# Patient Record
Sex: MALE | Race: WHITE | URBAN - METROPOLITAN AREA
[De-identification: names, ages, dates, MRNs, and addresses within clinical notes are randomized per-mention and may not be internally consistent; named-entity substitution may affect disease eponyms.]

---

## 2022-05-24 ENCOUNTER — OUT OF OFFICE VISIT (OUTPATIENT)
Dept: URBAN - METROPOLITAN AREA MEDICAL CENTER 1 | Facility: MEDICAL CENTER | Age: 47
End: 2022-05-24
Payer: SELF-PAY

## 2022-05-24 DIAGNOSIS — R93.3 ABN FINDINGS-GI TRACT: ICD-10-CM

## 2022-05-24 DIAGNOSIS — E80.6 ABNORMAL BILIRUBIN TEST: ICD-10-CM

## 2022-05-24 DIAGNOSIS — R11.2 ACUTE NAUSEA WITH NONBILIOUS VOMITING: ICD-10-CM

## 2022-05-24 DIAGNOSIS — R10.13 ABDOMINAL DISCOMFORT, EPIGASTRIC: ICD-10-CM

## 2022-05-24 DIAGNOSIS — K85.10 ACUTE BILIARY PANCREATITIS: ICD-10-CM

## 2022-05-24 DIAGNOSIS — R10.84 ABDOMINAL CRAMPING, GENERALIZED: ICD-10-CM

## 2022-05-24 PROCEDURE — 99232 SBSQ HOSP IP/OBS MODERATE 35: CPT | Performed by: INTERNAL MEDICINE

## 2022-05-24 PROCEDURE — 99254 IP/OBS CNSLTJ NEW/EST MOD 60: CPT | Performed by: INTERNAL MEDICINE

## 2023-01-11 ENCOUNTER — TELEPHONE ENCOUNTER (OUTPATIENT)
Dept: URBAN - NONMETROPOLITAN AREA CLINIC 2 | Facility: CLINIC | Age: 48
End: 2023-01-11

## 2023-02-16 ENCOUNTER — WEB ENCOUNTER (OUTPATIENT)
Dept: URBAN - NONMETROPOLITAN AREA CLINIC 13 | Facility: CLINIC | Age: 48
End: 2023-02-16

## 2023-02-16 ENCOUNTER — OFFICE VISIT (OUTPATIENT)
Dept: URBAN - NONMETROPOLITAN AREA CLINIC 13 | Facility: CLINIC | Age: 48
End: 2023-02-16
Payer: SELF-PAY

## 2023-02-16 VITALS
SYSTOLIC BLOOD PRESSURE: 129 MMHG | HEART RATE: 68 BPM | BODY MASS INDEX: 39.6 KG/M2 | WEIGHT: 292.4 LBS | HEIGHT: 72 IN | DIASTOLIC BLOOD PRESSURE: 89 MMHG

## 2023-02-16 DIAGNOSIS — K85.90 ACUTE PANCREATITIS, UNSPECIFIED COMPLICATION STATUS, UNSPECIFIED PANCREATITIS TYPE: ICD-10-CM

## 2023-02-16 DIAGNOSIS — K80.20 CALCULUS OF GALLBLADDER WITHOUT CHOLECYSTITIS WITHOUT OBSTRUCTION: ICD-10-CM

## 2023-02-16 PROBLEM — 70342003: Status: ACTIVE | Noted: 2023-02-16

## 2023-02-16 PROCEDURE — 99214 OFFICE O/P EST MOD 30 MIN: CPT | Performed by: INTERNAL MEDICINE

## 2023-02-16 NOTE — HPI-TODAY'S VISIT:
Miguel is a very pleasant 48-year-old gentleman, referred by Duncan Echeverria for history of pancreatitis.  A copy of records will be sent to his office.  He states that early 2022 he developed abdominal pain that was severe in nature.  He was ultimately seen in the emergency room where he was admitted for acute pancreatitis.  Liver tests were essentially normal.  Imaging did suggest gallstones without biliary ductal dilation.  He did have further imaging that did not suggest biliary obstruction.  There is no need for ERCP.  Surgical evaluation was entertained but he ultimately did not undergo cholecystectomy.  He is not a heavy drinker and given lack of liver test elevations the etiology of his pancreatitis was unclear.  Since that time he has not had any further episodes of acute pancreatitis.  He does have occasional episodes of abdominal pain which he thinks is diet related.  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  He does note that when he eats more unhealthy food he has more GI issues.  He also saw Mr. Waldo Mikal who apparently ordered a FibroScan which was unrevealing.  Most recent labs by Dr. Echeverria were normal.  These did not suggest any obvious liver abnormalities.

## 2023-06-15 ENCOUNTER — OFFICE VISIT (OUTPATIENT)
Dept: URBAN - NONMETROPOLITAN AREA CLINIC 13 | Facility: CLINIC | Age: 48
End: 2023-06-15

## 2024-03-14 ENCOUNTER — LAB (OUTPATIENT)
Dept: URBAN - NONMETROPOLITAN AREA CLINIC 13 | Facility: CLINIC | Age: 49
End: 2024-03-14

## 2024-03-14 ENCOUNTER — OV EP (OUTPATIENT)
Dept: URBAN - NONMETROPOLITAN AREA CLINIC 13 | Facility: CLINIC | Age: 49
End: 2024-03-14
Payer: SELF-PAY

## 2024-03-14 VITALS
BODY MASS INDEX: 37.52 KG/M2 | HEART RATE: 69 BPM | SYSTOLIC BLOOD PRESSURE: 132 MMHG | WEIGHT: 277 LBS | HEIGHT: 72 IN | DIASTOLIC BLOOD PRESSURE: 89 MMHG

## 2024-03-14 DIAGNOSIS — K85.80 ACUTE TRAUMATIC PANCREATITIS: ICD-10-CM

## 2024-03-14 DIAGNOSIS — R10.13 EPIGASTRIC PAIN: ICD-10-CM

## 2024-03-14 DIAGNOSIS — K80.20 CALCULUS OF GALLBLADDER WITHOUT CHOLECYSTITIS WITHOUT OBSTRUCTION: ICD-10-CM

## 2024-03-14 PROCEDURE — 99214 OFFICE O/P EST MOD 30 MIN: CPT | Performed by: NURSE PRACTITIONER

## 2024-03-14 RX ORDER — LISINOPRIL 40 MG/1
TAKE 1 TABLET BY MOUTH EVERY DAY TABLET ORAL
Qty: 90 EACH | Refills: 0 | Status: ACTIVE | COMMUNITY

## 2024-03-14 NOTE — HPI-OTHER HISTORIES
2/16/2023 Miguel is a very pleasant 48-year-old gentleman, referred by Duncan Echeverria for history of pancreatitis. A copy of records will be sent to his office. He states that early 2022 he developed abdominal pain that was severe in nature. He was ultimately seen in the emergency room where he was admitted for acute pancreatitis. Liver tests were essentially normal. Imaging did suggest gallstones without biliary ductal dilation. He did have further imaging that did not suggest biliary obstruction. There is no need for ERCP. Surgical evaluation was entertained but he ultimately did not undergo cholecystectomy. He is not a heavy drinker and given lack of liver test elevations the etiology of his pancreatitis was unclear. Since that time he has not had any further episodes of acute pancreatitis. He does have occasional episodes of abdominal pain which he thinks is diet related. 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 He does note that when he eats more unhealthy food he has more GI issues. He also saw Mr. Waldo Ren who apparently ordered a FibroScan which was unrevealing. Most recent labs by Dr. Echeverria were normal. These did not suggest any obvious liver abnormalities.

## 2024-03-14 NOTE — HPI-TODAY'S VISIT:
3/14/2024 Mr. Mike Villalobos is a 49 year old male referred by Dr Echeverria for recurrent pancreatitis. He wa last seen a year ago after an episode of acute pancreatitis in 2022. The etiology of the pancreatitis was not clear. He did have gallstones but no ductal dilatation. He has had another episode of acute pancreatitis last week with a lipase of 600 and elevated amalyse. His liver enzymes and bilirubin are normal. He had a CT 2/29 with a normal pancreas and a MRI 3/12 with no signs of pancreatitis. Last year he had a positive LUIS. His IgG 4 was normal. His attacks always start with feeling tired, middle back pain, lower back pain, and abdominal bloating and gurgling. He will cut back to liquids and light diet and his symptoms will resolve most of the time. This last time they continued. He denies any nausea, vomiting or diarrhea with it. His stool do become lighter and float. He has seen sugary foods may contribute. CS

## 2024-03-29 ENCOUNTER — EUS (OUTPATIENT)
Dept: URBAN - METROPOLITAN AREA MEDICAL CENTER 1 | Facility: MEDICAL CENTER | Age: 49
End: 2024-03-29

## 2024-04-26 ENCOUNTER — OV EP (OUTPATIENT)
Dept: URBAN - NONMETROPOLITAN AREA CLINIC 13 | Facility: CLINIC | Age: 49
End: 2024-04-26

## 2024-04-26 RX ORDER — LISINOPRIL 40 MG/1
TAKE 1 TABLET BY MOUTH EVERY DAY TABLET ORAL
Qty: 90 EACH | Refills: 0 | Status: ACTIVE | COMMUNITY

## 2024-04-26 NOTE — HPI-TODAY'S VISIT:
4/26/2024 Mr. Mike Villalobos is here for f/u of recurrent pancreatitis. He wa last seen a year ago after an episode of acute pancreatitis in 2022. The etiology of the pancreatitis was not clear. He did have gallstones but no ductal dilatation. He has had another episode of acute pancreatitis last week with a lipase of 600 and elevated amalyse. His liver enzymes and bilirubin are normal. He had a CT 2/29 with a normal pancreas and a MRI 3/12 with no signs of pancreatitis. Last year he had a positive LUIS. His IgG 4 was normal. His attacks always start with feeling tired, middle back pain, lower back pain, and abdominal bloating and gurgling. He will cut back to liquids and light diet and his symptoms will resolve most of the time. This last time they continued. He denies any nausea, vomiting or diarrhea with it. His stool do become lighter and float. He has seen sugary foods may contribute. CS

## 2024-04-26 NOTE — HPI-OTHER HISTORIES
2/16/2023 Miguel is a very pleasant 48-year-old gentleman, referred by Duncan Echeverria for history of pancreatitis. A copy of records will be sent to his office. He states that early 2022 he developed abdominal pain that was severe in nature. He was ultimately seen in the emergency room where he was admitted for acute pancreatitis. Liver tests were essentially normal. Imaging did suggest gallstones without biliary ductal dilation. He did have further imaging that did not suggest biliary obstruction. There is no need for ERCP. Surgical evaluation was entertained but he ultimately did not undergo cholecystectomy. He is not a heavy drinker and given lack of liver test elevations the etiology of his pancreatitis was unclear. Since that time he has not had any further episodes of acute pancreatitis. He does have occasional episodes of abdominal pain which he thinks is diet related. 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 He does note that when he eats more unhealthy food he has more GI issues. He also saw Mr. Waldo Ren who apparently ordered a FibroScan which was unrevealing. Most recent labs by Dr. Echeverria were normal. These did not suggest any obvious liver abnormalities.  3/14/2024 Mr. Mike Villalobos is a 49 year old male referred by Dr Echeverria for recurrent pancreatitis. He wa last seen a year ago after an episode of acute pancreatitis in 2022. The etiology of the pancreatitis was not clear. He did have gallstones but no ductal dilatation. He has had another episode of acute pancreatitis last week with a lipase of 600 and elevated amalyse. His liver enzymes and bilirubin are normal. He had a CT 2/29 with a normal pancreas and a MRI 3/12 with no signs of pancreatitis. Last year he had a positive LUIS. His IgG 4 was normal. His attacks always start with feeling tired, middle back pain, lower back pain, and abdominal bloating and gurgling. He will cut back to liquids and light diet and his symptoms will resolve most of the time. This last time they continued. He denies any nausea, vomiting or diarrhea with it. His stool do become lighter and float. He has seen sugary foods may contribute. CS  3/29/2024 EUS: normal esophagus, stomach, and duodenum. Normal pancreas and pancreatic duct. Normal CBD. Gallstones were noted. Unclear cause for pancreatitis could have been gallstones related- referred to surgery

## 2024-06-06 ENCOUNTER — OFFICE VISIT (OUTPATIENT)
Dept: URBAN - NONMETROPOLITAN AREA CLINIC 13 | Facility: CLINIC | Age: 49
End: 2024-06-06

## 2024-06-06 RX ORDER — LISINOPRIL 40 MG/1
TAKE 1 TABLET BY MOUTH EVERY DAY TABLET ORAL
Qty: 90 EACH | Refills: 0 | Status: ACTIVE | COMMUNITY

## 2024-06-06 NOTE — HPI-TODAY'S VISIT:
6/6/2024 Mr. Mike Villalobos is here for f/u of recurrent pancreatitis. He wa last seen a year ago after an episode of acute pancreatitis in 2022. The etiology of the pancreatitis was not clear. He did have gallstones but no ductal dilatation. He has had another episode of acute pancreatitis last week with a lipase of 600 and elevated amalyse. His liver enzymes and bilirubin are normal. He had a CT 2/29 with a normal pancreas and a MRI 3/12 with no signs of pancreatitis. Last year he had a positive LUIS. His IgG 4 was normal. His attacks always start with feeling tired, middle back pain, lower back pain, and abdominal bloating and gurgling. He will cut back to liquids and light diet and his symptoms will resolve most of the time. This last time they continued. He denies any nausea, vomiting or diarrhea with it. His stool do become lighter and float. He has seen sugary foods may contribute. CS

## 2025-05-05 ENCOUNTER — TELEPHONE ENCOUNTER (OUTPATIENT)
Dept: URBAN - NONMETROPOLITAN AREA CLINIC 13 | Facility: CLINIC | Age: 50
End: 2025-05-05

## 2025-05-07 ENCOUNTER — OFFICE VISIT (OUTPATIENT)
Dept: URBAN - NONMETROPOLITAN AREA CLINIC 13 | Facility: CLINIC | Age: 50
End: 2025-05-07
Payer: COMMERCIAL

## 2025-05-07 ENCOUNTER — LAB OUTSIDE AN ENCOUNTER (OUTPATIENT)
Dept: URBAN - NONMETROPOLITAN AREA CLINIC 2 | Facility: CLINIC | Age: 50
End: 2025-05-07

## 2025-05-07 ENCOUNTER — LAB OUTSIDE AN ENCOUNTER (OUTPATIENT)
Dept: URBAN - NONMETROPOLITAN AREA CLINIC 13 | Facility: CLINIC | Age: 50
End: 2025-05-07

## 2025-05-07 DIAGNOSIS — R19.4 CHANGE IN BOWEL HABIT: ICD-10-CM

## 2025-05-07 DIAGNOSIS — Z12.11 COLON CANCER SCREENING: ICD-10-CM

## 2025-05-07 DIAGNOSIS — K85.90 ACUTE PANCREATITIS, UNSPECIFIED COMPLICATION STATUS, UNSPECIFIED PANCREATITIS TYPE: ICD-10-CM

## 2025-05-07 DIAGNOSIS — K80.20 CALCULUS OF GALLBLADDER WITHOUT CHOLECYSTITIS WITHOUT OBSTRUCTION: ICD-10-CM

## 2025-05-07 DIAGNOSIS — R10.13 EPIGASTRIC PAIN: ICD-10-CM

## 2025-05-07 DIAGNOSIS — R10.32 LLQ PAIN: ICD-10-CM

## 2025-05-07 LAB
A/G RATIO: 1.6
ALBUMIN: 4.5
ALKALINE PHOSPHATASE: 75
ALT (SGPT): 26
ANION GAP: 12
AST (SGOT): 23
BILIRUBIN TOTAL: 1
BLOOD UREA NITROGEN: 13
BUN / CREAT RATIO: 12
C-REACTIVE PROTEIN: 0.24
CALCIUM: 9.5
CHLORIDE: 102
CO2: 26
CREATININE, SERUM: 1.06
EGFR (CKD-EPI): >60
GLUCOSE: 93
HEMATOCRIT: 44.2
HEMOGLOBIN: 15.6
LIPASE: 31
MEAN CORPUSCULAR HEMOGLOBIN CONC: 35.3
MEAN CORPUSCULAR HEMOGLOBIN: 29.5
MEAN CORPUSCULAR VOLUME: 83.7
MEAN PLATELET VOLUME: 7.3
PLATELET COUNT: 260
POTASSIUM: 4
PROTEIN TOTAL: 7.4
RED BLOOD CELL COUNT: 5.28
RED CELL DISTRIBUTION WIDTH: 13.8
SODIUM: 136
WHITE BLOOD CELL COUNT: 6

## 2025-05-07 PROCEDURE — 99214 OFFICE O/P EST MOD 30 MIN: CPT | Performed by: NURSE PRACTITIONER

## 2025-05-07 RX ORDER — LISINOPRIL 40 MG/1
TAKE 1 TABLET BY MOUTH EVERY DAY TABLET ORAL
Qty: 90 EACH | Refills: 0 | Status: ON HOLD | COMMUNITY

## 2025-05-07 NOTE — HPI-TODAY'S VISIT:
5/7/2025 Mr. Mike Villalobos is here for acute abdominal pain. He was last seen a year ago for recurrent pancreatitis. The cause of his pancreatitis has not been clear. He does have gallstones. He had an EUS last year that was normal. No signs of chronic pancreatitis or ductal dilatation. Today, he reports he has done well until recently. He started having a pain below his belly button and to the left. He has had months of soft to diarrhea stools. The pain is a 2 but when eats it becomes a 5. He denies any fever or chills. He has never had a colonoscopy. He denies any family hx. he denies any blood in his stool. CS

## 2025-05-08 ENCOUNTER — TELEPHONE ENCOUNTER (OUTPATIENT)
Dept: URBAN - NONMETROPOLITAN AREA CLINIC 13 | Facility: CLINIC | Age: 50
End: 2025-05-08

## 2025-05-09 ENCOUNTER — LAB OUTSIDE AN ENCOUNTER (OUTPATIENT)
Dept: URBAN - NONMETROPOLITAN AREA CLINIC 2 | Facility: CLINIC | Age: 50
End: 2025-05-09

## 2025-05-09 LAB
ADENOVIRUS F40/41: NOT DETECTED
ASTROVIRUS: NOT DETECTED
CAMPYLOBACTER SPECIES: NOT DETECTED
CLOSTRIDIUM DIFFICILE TOXIN: DETECTED
CRYPTOSPORIDIUM SPECIES: NOT DETECTED
CYCLOSPORA CAYETANENSIS: NOT DETECTED
ENTAMOEBA HISTOLYTICA DNA: NOT DETECTED
ENTEROAGGREGATIVE E. COLI (EAEC): NOT DETECTED
ENTEROPATHOGENIC E. COLI (EPEC): NOT DETECTED
ENTEROTOXIGENIC E. COLI (ETEC): NOT DETECTED
GIARDIA LAMBLIA DNA: NOT DETECTED
NOROVIRUS GI/GII: NOT DETECTED
PLESIOMONAS SHIGELLOIDES: NOT DETECTED
ROTAVIRUS RNA: NOT DETECTED
SALMONELLA SPECIES: NOT DETECTED
SAPOVIRUS: NOT DETECTED
SHIGA TOXIN PRODUCING E. COLI: NOT DETECTED
SHIGELLA/ENTEROINVASIVE E. COLI: NOT DETECTED
VIBRIO CHOLERAE: NOT DETECTED
VIBRIO SPECIES: NOT DETECTED
YERSINIA ENTEROCOLITICA: NOT DETECTED

## 2025-05-10 LAB — TOXIN: NEGATIVE

## 2025-05-12 ENCOUNTER — WEB ENCOUNTER (OUTPATIENT)
Dept: URBAN - NONMETROPOLITAN AREA CLINIC 13 | Facility: CLINIC | Age: 50
End: 2025-05-12

## 2025-05-16 ENCOUNTER — TELEPHONE ENCOUNTER (OUTPATIENT)
Dept: URBAN - NONMETROPOLITAN AREA CLINIC 2 | Facility: CLINIC | Age: 50
End: 2025-05-16

## 2025-05-16 LAB — PANCREATIC ELASTASE-1: >800

## 2025-05-20 ENCOUNTER — TELEPHONE ENCOUNTER (OUTPATIENT)
Dept: URBAN - NONMETROPOLITAN AREA CLINIC 2 | Facility: CLINIC | Age: 50
End: 2025-05-20

## 2025-05-28 ENCOUNTER — OFFICE VISIT (OUTPATIENT)
Dept: URBAN - NONMETROPOLITAN AREA CLINIC 13 | Facility: CLINIC | Age: 50
End: 2025-05-28
Payer: COMMERCIAL

## 2025-05-28 ENCOUNTER — DASHBOARD ENCOUNTERS (OUTPATIENT)
Age: 50
End: 2025-05-28

## 2025-05-28 DIAGNOSIS — R10.32 LLQ PAIN: ICD-10-CM

## 2025-05-28 DIAGNOSIS — R10.13 EPIGASTRIC PAIN: ICD-10-CM

## 2025-05-28 DIAGNOSIS — R19.4 CHANGE IN BOWEL HABIT: ICD-10-CM

## 2025-05-28 DIAGNOSIS — Z12.11 COLON CANCER SCREENING: ICD-10-CM

## 2025-05-28 DIAGNOSIS — K85.90 ACUTE PANCREATITIS, UNSPECIFIED COMPLICATION STATUS, UNSPECIFIED PANCREATITIS TYPE: ICD-10-CM

## 2025-05-28 DIAGNOSIS — K80.20 CALCULUS OF GALLBLADDER WITHOUT CHOLECYSTITIS WITHOUT OBSTRUCTION: ICD-10-CM

## 2025-05-28 PROCEDURE — 99213 OFFICE O/P EST LOW 20 MIN: CPT | Performed by: NURSE PRACTITIONER

## 2025-05-28 RX ORDER — LISINOPRIL 40 MG/1
TAKE 1 TABLET BY MOUTH EVERY DAY TABLET ORAL
Qty: 90 EACH | Refills: 0 | Status: ON HOLD | COMMUNITY

## 2025-05-28 NOTE — HPI-TODAY'S VISIT:
5/28/2025 Mr. Mike Villalobos is here for f/u of acute abdominal pain and softer stools. He was having a pain below his belly button and to the left along with months of soft to diarrhea stools. His labs, stool studies, and CT scan were negative. Today, the pain has mostly resovled. His stools remain soft. CS

## 2025-05-28 NOTE — HPI-OTHER HISTORIES
2/16/2023 Miguel is a very pleasant 48-year-old gentleman, referred by Duncan Echeverria for history of pancreatitis. A copy of records will be sent to his office. He states that early 2022 he developed abdominal pain that was severe in nature. He was ultimately seen in the emergency room where he was admitted for acute pancreatitis. Liver tests were essentially normal. Imaging did suggest gallstones without biliary ductal dilation. He did have further imaging that did not suggest biliary obstruction. There is no need for ERCP. Surgical evaluation was entertained but he ultimately did not undergo cholecystectomy. He is not a heavy drinker and given lack of liver test elevations the etiology of his pancreatitis was unclear. Since that time he has not had any further episodes of acute pancreatitis. He does have occasional episodes of abdominal pain which he thinks is diet related. 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 He does note that when he eats more unhealthy food he has more GI issues. He also saw Mr. Waldo Ren who apparently ordered a FibroScan which was unrevealing. Most recent labs by Dr. Echeverria were normal. These did not suggest any obvious liver abnormalities.  3/14/2024 Mr. Mike Villalobos is a 49 year old male referred by Dr Echeverria for recurrent pancreatitis. He wa last seen a year ago after an episode of acute pancreatitis in 2022. The etiology of the pancreatitis was not clear. He did have gallstones but no ductal dilatation. He has had another episode of acute pancreatitis last week with a lipase of 600 and elevated amalyse. His liver enzymes and bilirubin are normal. He had a CT 2/29 with a normal pancreas and a MRI 3/12 with no signs of pancreatitis. Last year he had a positive LUIS. His IgG 4 was normal. His attacks always start with feeling tired, middle back pain, lower back pain, and abdominal bloating and gurgling. He will cut back to liquids and light diet and his symptoms will resolve most of the time. This last time they continued. He denies any nausea, vomiting or diarrhea with it. His stool do become lighter and float. He has seen sugary foods may contribute. CS  3/29/2024 EUS: normal esophagus, stomach, and duodenum. Normal pancreas and pancreatic duct. Normal CBD. Gallstones were noted. Unclear cause for pancreatitis could have been gallstones related- referred to surgery  5/7/2025 Mr. Mike Villalobos is here for acute abdominal pain. He was last seen a year ago for recurrent pancreatitis. The cause of his pancreatitis has not been clear. He does have gallstones. He had an EUS last year that was normal. No signs of chronic pancreatitis or ductal dilatation. Today, he reports he has done well until recently. He started having a pain below his belly button and to the left. He has had months of soft to diarrhea stools. The pain is a 2 but when eats it becomes a 5. He denies any fever or chills. He has never had a colonoscopy. He denies any family hx. he denies any blood in his stool. CS  5/19/2025 CT: normal pancreas, no sign of acute inflammation to suggest diverticulitis.

## 2025-07-09 ENCOUNTER — OFFICE VISIT (OUTPATIENT)
Dept: URBAN - NONMETROPOLITAN AREA CLINIC 13 | Facility: CLINIC | Age: 50
End: 2025-07-09